# Patient Record
Sex: MALE | Race: OTHER
[De-identification: names, ages, dates, MRNs, and addresses within clinical notes are randomized per-mention and may not be internally consistent; named-entity substitution may affect disease eponyms.]

---

## 2019-06-27 ENCOUNTER — HOSPITAL ENCOUNTER (OUTPATIENT)
Dept: HOSPITAL 91 - HKI | Age: 54
Discharge: HOME | End: 2019-06-27
Payer: COMMERCIAL

## 2019-06-27 ENCOUNTER — HOSPITAL ENCOUNTER (OUTPATIENT)
Dept: HOSPITAL 10 - HKI | Age: 54
Discharge: HOME | End: 2019-06-27
Payer: COMMERCIAL

## 2019-06-27 DIAGNOSIS — M25.561: Primary | ICD-10-CM

## 2019-06-27 DIAGNOSIS — Z94.4: ICD-10-CM

## 2019-06-27 DIAGNOSIS — M17.11: ICD-10-CM

## 2019-06-27 PROCEDURE — G0463 HOSPITAL OUTPT CLINIC VISIT: HCPCS

## 2019-06-27 NOTE — CONS
Assessment/Plan


Assessment/Plan


Hospital Course (Demo Recall)


54-year-old male who had a mild insufficiency fracture of his medial femoral 


condyle secondary to sudden increase in activity.  Currently he is asymptomatic 


and the MRI showed minimal edema at the time of acquisition.  At this time 


recommending starting a formal physical therapy to slowly increase physical 


activity and strength in a stepwise and guided manner.  He is to use pain as his


guide.





Follow-up as needed.





Consultation Date/Type/Reason


Admit Date/Time





Date of Consultation:  Jun 27, 2019


Reason for Consultation


Right knee pain


Date/Time of Note


DATE: 6/27/19 


TIME: 14:32





Hx of Present Illness


This is a 54-year-old male history of liver transplant with a chief complaint of


right knee pain. The pain began approximately 7 months ago.  The patient was 


increasing his physical activity in order to lose weight and decrease his 


hypertension.  He began walking 2 hours a day which was not his usual routine.  


He began to have some medial sided knee pain over his medial femoral condyle.  


Over the course of several days is became worse and worse.  Then he was unable 


to put full weight and was using crutches and a cane.  He had x-rays that were 


reportedly normal.  He rested his knee.  He continued to have pain and in 


February he had an MRI.  At the time his pain was 7/10.  It was sharp and 


throbbing.  He could not walk more than a block.  Again he was using a cane or 


crutches.  Any weightbearing movement worsened the knee.  Rest made the knee 


pain better.  Currently he feels that he is no longer limping and does not 


require a gait aid.  He states he has 0/10 pain.  Denies numbness and tingling 


in his lower leg except for his toes which is secondary to his immunosupp


ressants.


Patient denies fever, chills, shortness of breath, chest pain, nausea/vomiting, 


constipation, diarrhea, numbness, and tingling.





Past Medical History


Hepatitis C


Liver cirrhosis status post liver transplant x2


Hypertension


Asthma





Past Surgical History


Liver transplant x2





Family History


Significant Family History:  no pertinent family hx





Social History


Alcohol Use:  none


Smoking Status:  Former smoker


Drug Use:  none





Exam/Review of Systems


Exam


Vitals


Weight: 214 pounds


Height: 5 foot 9 inch





Temperature: 98 point


Heart Rate: 66


Blood Pressure: 155/101


Respiratory Rate: 14


Exam


General: Alert, oriented x3.  No Acute Distress.


Heart: Regular rate and rhythm.


Lungs: No respiratory distress. No accessory muscle use.





Musculoskeletal:


Right Knee


This is a well developed male who is alert, oriented times three and in no 


apparent distress.


Skin is intact over the right knee as well as the lower extremity with no katerin


sions,


lacerations, or ulcerations. Observation of the patient's gait reveals a non-


antalgic gait with


No thrust. Frontal plane alignment is neutral. There is pain on palpation to his


medial femoral condyle.  There is no tenderness palpation over either joint 


line.  The


patient demonstrates grinding anteriorly with ROM. Range of motion: 0 extension 


to


approximately 130 degrees of flexion. Collateral ligament testing reveals no 


instability with


varus or valgus stress at 0 and 30 degrees of flexion.  Negative Lachman's and 


negative posterior drawer. Neurovascularly intact with 5/5 EHL/tibialis 


anterior/gastroc. Sensation intact to light touch in a sural, saphenous, deep 


peroneal, superficial peroneal, medial and lateral plantar nerve distribution.  


Palpable, symmetric dorsalis pedis and posterior tibial pulses in both lower 


extremities. Hip examination normal.





Imaging


Imaging


MRI of the right knee from outside facility was personally reviewed.  This MRI 


was performed on 2/28/2019.





Mild intrameniscal degenerative changes of the posterior horn and body of the 


medial meniscus.  No discrete tear.  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  The 


anterior and posterior cruciate ligaments are intact.  MCL and LCL are intact.  


No chondromalacia in any compartment.





There is mild trabecular bone edema of the medial femoral condyle consistent 


with a contusion.











KESHAV JACOBS MD               Jun 27, 2019 14:43